# Patient Record
Sex: MALE | Race: WHITE
[De-identification: names, ages, dates, MRNs, and addresses within clinical notes are randomized per-mention and may not be internally consistent; named-entity substitution may affect disease eponyms.]

---

## 2018-08-19 ENCOUNTER — HOSPITAL ENCOUNTER (EMERGENCY)
Dept: HOSPITAL 25 - UCCORT | Age: 38
Discharge: HOME | End: 2018-08-19
Payer: COMMERCIAL

## 2018-08-19 VITALS — SYSTOLIC BLOOD PRESSURE: 151 MMHG | DIASTOLIC BLOOD PRESSURE: 91 MMHG

## 2018-08-19 DIAGNOSIS — R50.9: Primary | ICD-10-CM

## 2018-08-19 PROCEDURE — 86666 EHRLICHIA ANTIBODY: CPT

## 2018-08-19 PROCEDURE — 86308 HETEROPHILE ANTIBODY SCREEN: CPT

## 2018-08-19 PROCEDURE — 99212 OFFICE O/P EST SF 10 MIN: CPT

## 2018-08-19 PROCEDURE — 80053 COMPREHEN METABOLIC PANEL: CPT

## 2018-08-19 PROCEDURE — 86703 HIV-1/HIV-2 1 RESULT ANTBDY: CPT

## 2018-08-19 PROCEDURE — 86753 PROTOZOA ANTIBODY NOS: CPT

## 2018-08-19 PROCEDURE — 86618 LYME DISEASE ANTIBODY: CPT

## 2018-08-19 PROCEDURE — 36415 COLL VENOUS BLD VENIPUNCTURE: CPT

## 2018-08-19 PROCEDURE — 86645 CMV ANTIBODY IGM: CPT

## 2018-08-19 PROCEDURE — G0463 HOSPITAL OUTPT CLINIC VISIT: HCPCS

## 2018-08-19 PROCEDURE — 71046 X-RAY EXAM CHEST 2 VIEWS: CPT

## 2018-08-19 NOTE — UC
HPI Febrile Illness





- HPI Summary


HPI Summary: 





Started getting a fever5 days ago after spending the day in the sun. Since then 

has been trying to get rest and plenty of fluids, continues to have fever and 

violent chills every day. Denies cough, n/v/d, rash, urinary symptoms, or focal 

pain. Does have regular headaches and some joint aches when the fever spikes. 





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 

exposure.





- History of Current Complaint


Chief Complaint: UCGeneralIllness


Time Seen by Provider: 08/19/18 17:19


Hx Obtained From: Patient


Onset/Duration: Started Days Ago


Timing: Constant


Initial Severity: Moderate


Current Severity: Moderate


Pain Intensity: 6


Aggravating Factors: Nothing


Alleviating Factors: Cool Soaks, OTC Medicine


Associated Signs and Symptoms: Chills





- Allergy/Home Medications


Allergies/Adverse Reactions: 


 Allergies











Allergy/AdvReac Type Severity Reaction Status Date / Time


 


No Known Allergies Allergy   Verified 08/19/18 17:15














PMH/Surg Hx/FS Hx/Imm Hx


Previously Healthy: Yes





- Surgical History


Surgical History: Yes


Surgery Procedure, Year, and Place: Left elbow surgery 1994, hernia x2 2005 & 

2007, VASECTOMY





- Family History


Known Family History: Positive: Seizure Disorder - sister, coincided with 

pregnancies, none recently, Other - grandmother with strokes


   Negative: Cardiac Disease, Blood Disorder





- Social History


Occupation: Employed Full-time


Alcohol Use: Rare


Substance Use Type: None


Smoking Status (MU): Never Smoked Tobacco





- Immunization History


Most Recent Tetanus Shot: 2004





Review of Systems


Constitutional: Fever, Chills, Fatigue


Skin: Negative


Eyes: Negative


ENT: Negative


Respiratory: Negative


Cardiovascular: Negative


Gastrointestinal: Negative


Genitourinary: Negative


Motor: Negative


Neurovascular: Negative


Musculoskeletal: Negative


Neurological: Headache


Psychological: Negative


Is Patient Immunocompromised?: No


All Other Systems Reviewed And Are Negative: Yes





Physical Exam


Triage Information Reviewed: Yes


Appearance: Well-Nourished, Ill-Appearing


Vital Signs: 


 Initial Vital Signs











Temp  101.9 F   08/19/18 17:15


 


Pulse  67   08/19/18 17:15


 


Resp  19   08/19/18 17:15


 


BP  151/91   08/19/18 17:15


 


Pulse Ox  100   08/19/18 17:15











Vital Signs Reviewed: Yes


Eye Exam: Normal


Eyes: Positive: Conjunctiva Clear


ENT Exam: Normal


ENT: Positive: Normal ENT inspection, Hearing grossly normal, Pharynx normal, 

TMs normal.  Negative: Pharyngeal erythema, Nasal congestion


Dental Exam: Normal


Neck exam: Normal


Neck: Positive: Supple


Respiratory Exam: Normal


Respiratory: Positive: Chest non-tender, Lungs clear, Normal breath sounds, No 

respiratory distress, No accessory muscle use


Cardiovascular Exam: Normal


Cardiovascular: Positive: RRR, No Murmur


Musculoskeletal Exam: Normal


Musculoskeletal: Positive: Strength Intact


Neurological Exam: Normal


Neurological: Positive: Alert


Psychological Exam: Normal


Skin Exam: Normal





Diagnostics





- Radiology


  ** No standard instances


Xray Interpretation: No Acute Changes


Radiology Interpretation Completed By: Radiologist





Course/Dx





- Diagnoses


Clinic Provider Diagnoses: fever





Discharge





- Sign-Out/Discharge


Documenting (check all that apply): Patient Departure





- Discharge Plan


Condition: Stable


Disposition: HOME


Prescriptions: 


Doxycycline Hyclate 100 mg PO BID #20 tablet


Patient Education Materials:  Fever in Adults (ED)


Referrals: 


No Primary Care Phys,NOPCP [Primary Care Provider] - 


[facility] PHYSICIAN REFERRAL [Outside] - 7 Days


Additional Instructions: 


I have drawn many labs to try and determine what has been making you sick. I 

suspect it may be tick-borne illness, so I am starting treatment with 

doxycycline while we wait on labs.  Please make every effort to see a primary 

care provider within a week for further evaluation. You can call Ginette Maloney at the 

[facility] if you need help making an appointment.





- Billing Disposition and Condition


Condition: STABLE


Disposition: Home

## 2024-03-26 NOTE — RAD
HISTORY: fever x 6 days, malaise



COMPARISONS: January 12, 2016



VIEWS: 4: Frontal dual-energy and lateral views of the chest.



FINDINGS:

CARDIOMEDIASTINAL SILHOUETTE: The cardiomediastinal silhouette is normal.

REESE: The reese are normal.

PLEURA: The costophrenic angles are sharp. No pleural abnormalities are noted.

LUNG PARENCHYMA: The lungs are clear.

ABDOMEN: The upper abdomen is clear. There is no subphrenic gas.

BONES AND SOFT TISSUES: No bone or soft tissue abnormalities are noted.

OTHER: None.



IMPRESSION:

NO ACTIVE CARDIOPULMONARY DISEASE. English